# Patient Record
Sex: MALE | Race: ASIAN | NOT HISPANIC OR LATINO | Employment: FULL TIME
[De-identification: names, ages, dates, MRNs, and addresses within clinical notes are randomized per-mention and may not be internally consistent; named-entity substitution may affect disease eponyms.]

---

## 2018-09-08 ENCOUNTER — HOSPITAL (OUTPATIENT)
Dept: OTHER | Age: 53
End: 2018-09-08

## 2019-05-30 ENCOUNTER — HOSPITAL (OUTPATIENT)
Dept: OTHER | Age: 54
End: 2019-05-30
Attending: INTERNAL MEDICINE

## 2020-09-29 ENCOUNTER — APPOINTMENT (OUTPATIENT)
Dept: ULTRASOUND IMAGING | Age: 55
End: 2020-09-29
Attending: INTERNAL MEDICINE

## 2021-05-14 ENCOUNTER — TELEPHONE (OUTPATIENT)
Dept: SURGERY | Facility: CLINIC | Age: 56
End: 2021-05-14

## 2021-05-14 ENCOUNTER — OFFICE VISIT (OUTPATIENT)
Dept: SURGERY | Facility: CLINIC | Age: 56
End: 2021-05-14
Payer: COMMERCIAL

## 2021-05-14 ENCOUNTER — TELEPHONE (OUTPATIENT)
Dept: PAIN CLINIC | Facility: CLINIC | Age: 56
End: 2021-05-14

## 2021-05-14 VITALS
HEIGHT: 72 IN | SYSTOLIC BLOOD PRESSURE: 118 MMHG | DIASTOLIC BLOOD PRESSURE: 76 MMHG | WEIGHT: 220 LBS | BODY MASS INDEX: 29.8 KG/M2

## 2021-05-14 DIAGNOSIS — R29.898 RIGIDITY (MUSCLES): ICD-10-CM

## 2021-05-14 DIAGNOSIS — M48.02 CERVICAL STENOSIS OF SPINAL CANAL: ICD-10-CM

## 2021-05-14 DIAGNOSIS — G25.9 MOVEMENT DISORDER: ICD-10-CM

## 2021-05-14 DIAGNOSIS — M48.04 THORACIC STENOSIS: ICD-10-CM

## 2021-05-14 DIAGNOSIS — R26.9 NEUROLOGIC GAIT DYSFUNCTION: Primary | ICD-10-CM

## 2021-05-14 PROCEDURE — 3078F DIAST BP <80 MM HG: CPT | Performed by: PHYSICIAN ASSISTANT

## 2021-05-14 PROCEDURE — 3074F SYST BP LT 130 MM HG: CPT | Performed by: PHYSICIAN ASSISTANT

## 2021-05-14 PROCEDURE — 3008F BODY MASS INDEX DOCD: CPT | Performed by: PHYSICIAN ASSISTANT

## 2021-05-14 PROCEDURE — 99204 OFFICE O/P NEW MOD 45 MIN: CPT | Performed by: PHYSICIAN ASSISTANT

## 2021-05-14 RX ORDER — GLIPIZIDE 10 MG/1
10 TABLET ORAL DAILY
COMMUNITY
End: 2021-11-17

## 2021-05-14 RX ORDER — B-COMPLEX WITH VITAMIN C
TABLET ORAL
COMMUNITY

## 2021-05-14 RX ORDER — CYANOCOBALAMIN 500 UG/1
SPRAY NASAL
COMMUNITY
Start: 2021-02-18

## 2021-05-14 RX ORDER — ROSUVASTATIN CALCIUM 5 MG/1
5 TABLET, COATED ORAL DAILY
COMMUNITY
Start: 2021-04-22

## 2021-05-14 NOTE — PROGRESS NOTES
Right foot is more bothersome  Pain in the back is   Recently lost 60lbs over the last year then noticed foot issue  Has been doing PT  Feels that he has lost 40% of strength, feels like when he is carrying cat up the stairs that is difficult    Had been e

## 2021-05-14 NOTE — PATIENT INSTRUCTIONS
Refill policies:    • Allow 2-3 business days for refills; controlled substances may take longer.   • Contact your pharmacy at least 5 days prior to running out of medication and have them send an electronic request or submit request through the “request re Depending on your insurance carrier, approval may take 3-10 days. It is highly recommended patients contact their insurance carrier directly to determine coverage.   If test is done without insurance authorization, patient may be responsible for the entire his lumbar radiculopathy is easily explained by his lumbar MRI. His current gait pattern and stiffness in the upper and lower extremities is more consistent with a movement disorder than cervical myelopathy.   Further recommendation be forthcoming based up

## 2021-05-14 NOTE — PROGRESS NOTES
[facility] Neurosurgery Consultation      HISTORY OF PRESENT Meka Romeo is a 60year old male here for a spinal consultation. Patient was seen by a neurologist Dr. Amira Christian 11/4/2020. She diagnosed the patient with lumbar neuroforaminal stenosis.   R declining. His fine motor seems to be intact but he is having trouble with keeping a keyboard at the same speed he states it is slower than it used to be he has trouble using his right hand to get his wallet out of his pocket.       PAST MEDICAL HISTORY: both arms and legs. No clonus. Patient can heel and toe walk. Negative Spurling's. Negative Blackwell's. Negative straight leg raise bilaterally but severely tight hamstrings bilaterally.     Upper extremity strength:       Deltoid    Triceps     Biceps

## 2021-05-14 NOTE — TELEPHONE ENCOUNTER
Pt brought in discs of MR Left Shoulder and MR Lumbar Spine dated 3.2.21 and MR Lumbar Spine dated 10. 6.20 from [facility]. Downloaded to [employer].   Discs returned to patient

## 2021-05-14 NOTE — TELEPHONE ENCOUNTER
Pt dropped off MR Lumbar Spine (10.6.20) and MR Shoulder Left Arthrogram (3.5.21) Reports from [facility]. Physician reviewed during [address]   Sent to scanning

## 2021-05-19 NOTE — TELEPHONE ENCOUNTER
Prior authorization request completed for: MRI Brain. cervical and thoracic  Authorization #[id number] at [address] dates: 5/14/21-8/12/21  CPT codes approved: 19520  Number of visits/dates of service approved: 1  Mari

## 2021-05-19 NOTE — TELEPHONE ENCOUNTER
Charley from [facility] called to have (3) MRI orders faxed to [facility]  pt has appt there at 4p today. Faxed to [phone]. Confirmation rec'd.

## 2021-05-19 NOTE — TELEPHONE ENCOUNTER
Notification by PSR of imaging order-request received. Per PSR, orders have been faxed to requesting imaging facility. At [address] [place] [address] per HAJA  [place], [place] on 5/14/21:    \"IMAGING:  MRI of the lumbar spine shows multilevel spondylosis with loss of disc base hei

## 2021-06-04 ENCOUNTER — OFFICE VISIT (OUTPATIENT)
Dept: SURGERY | Facility: CLINIC | Age: 56
End: 2021-06-04
Payer: COMMERCIAL

## 2021-06-04 ENCOUNTER — TELEPHONE (OUTPATIENT)
Dept: SURGERY | Facility: CLINIC | Age: 56
End: 2021-06-04

## 2021-06-04 VITALS — SYSTOLIC BLOOD PRESSURE: 110 MMHG | DIASTOLIC BLOOD PRESSURE: 74 MMHG | HEART RATE: 64 BPM

## 2021-06-04 DIAGNOSIS — M54.12 CERVICAL MYELOPATHY WITH CERVICAL RADICULOPATHY (HCC): Primary | ICD-10-CM

## 2021-06-04 DIAGNOSIS — G95.9 CERVICAL MYELOPATHY WITH CERVICAL RADICULOPATHY (HCC): Primary | ICD-10-CM

## 2021-06-04 DIAGNOSIS — M48.062 SPINAL STENOSIS OF LUMBAR REGION WITH NEUROGENIC CLAUDICATION: ICD-10-CM

## 2021-06-04 DIAGNOSIS — M48.04 THORACIC STENOSIS: ICD-10-CM

## 2021-06-04 PROCEDURE — 3074F SYST BP LT 130 MM HG: CPT | Performed by: PHYSICIAN ASSISTANT

## 2021-06-04 PROCEDURE — 99214 OFFICE O/P EST MOD 30 MIN: CPT | Performed by: PHYSICIAN ASSISTANT

## 2021-06-04 PROCEDURE — 3078F DIAST BP <80 MM HG: CPT | Performed by: PHYSICIAN ASSISTANT

## 2021-06-04 NOTE — PROGRESS NOTES
Pt is here for follow up after imaging, imaging disc brought with pt and uploaded by  staff    Pt denies any pain at the moment     Pt states he is feeling better  Increased strength

## 2021-06-04 NOTE — PATIENT INSTRUCTIONS
Refill policies:    • Allow 2-3 business days for refills; controlled substances may take longer.   • Contact your pharmacy at least 5 days prior to running out of medication and have them send an electronic request or submit request through the “request re Depending on your insurance carrier, approval may take 3-10 days. It is highly recommended patients contact their insurance carrier directly to determine coverage.   If test is done without insurance authorization, patient may be responsible for the entire therapy  If he remains doing well we can see him back in 3 months for strength evaluation if he has a decline recommend he follow-up with a surgeon for his neck  If we isolate any myelopathy from the thoracic spine we can address that as well  If his leg p

## 2021-06-04 NOTE — TELEPHONE ENCOUNTER
Pt brought in MRI disc for brain, cervical and thoracic dated 5.19.21. Also had disc of brain MRI dated 1.29.16. Downloaded into system. Returned discs to patient at office visit.

## 2021-06-04 NOTE — PROGRESS NOTES
[facility]   Follow-up      HISTORY OF PRESENT Farzaneh Tan is a 60year old male she denies using it he has seen a provider who recommended he stop Lipitor and another medication since that time his legs are felt better he is overall felt bowel or bladder incontinence. Denies neck pain or arm pain. Denies numbness in the arms or legs. Denies any specific weakness in the arms but he does get heaviness and stiffness when he first gets up in the morning.   He does state when he walks he limi intact. Face is symmetrical.  He does have slight affect that is flat in the face. Cranial nerves II through XII are intact. Negative pronator drift. Normal finger-to-nose.   Normal rapid alternating movements with may be slight decrease on the right ha cervical spine shows stenosis at C4-5 C5-6 and C6-7.     MRI of the thoracic spine shows spondylitic disc with stenosis at C2-3 and T8-9    ASSESSMENT:  Neurological gait dysfunction  Rigidity and stiffness in the upper and lower extremities  cervical steno

## 2021-08-24 ENCOUNTER — OFFICE VISIT (OUTPATIENT)
Dept: SURGERY | Facility: CLINIC | Age: 56
End: 2021-08-24
Payer: COMMERCIAL

## 2021-08-24 VITALS
OXYGEN SATURATION: 99 % | SYSTOLIC BLOOD PRESSURE: 104 MMHG | HEIGHT: 71.5 IN | DIASTOLIC BLOOD PRESSURE: 70 MMHG | WEIGHT: 223 LBS | RESPIRATION RATE: 16 BRPM | BODY MASS INDEX: 30.54 KG/M2 | HEART RATE: 83 BPM

## 2021-08-24 DIAGNOSIS — M48.04 THORACIC STENOSIS: ICD-10-CM

## 2021-08-24 DIAGNOSIS — M48.062 SPINAL STENOSIS OF LUMBAR REGION WITH NEUROGENIC CLAUDICATION: ICD-10-CM

## 2021-08-24 DIAGNOSIS — R26.9 NEUROLOGIC GAIT DYSFUNCTION: ICD-10-CM

## 2021-08-24 DIAGNOSIS — G95.9 CERVICAL MYELOPATHY WITH CERVICAL RADICULOPATHY (HCC): Primary | ICD-10-CM

## 2021-08-24 DIAGNOSIS — M54.12 CERVICAL MYELOPATHY WITH CERVICAL RADICULOPATHY (HCC): Primary | ICD-10-CM

## 2021-08-24 PROCEDURE — 99213 OFFICE O/P EST LOW 20 MIN: CPT | Performed by: PHYSICIAN ASSISTANT

## 2021-08-24 PROCEDURE — 3074F SYST BP LT 130 MM HG: CPT | Performed by: PHYSICIAN ASSISTANT

## 2021-08-24 PROCEDURE — 3078F DIAST BP <80 MM HG: CPT | Performed by: PHYSICIAN ASSISTANT

## 2021-08-24 PROCEDURE — 3008F BODY MASS INDEX DOCD: CPT | Performed by: PHYSICIAN ASSISTANT

## 2021-08-24 NOTE — PROGRESS NOTES
[facility]   Follow-up      HISTORY OF PRESENT Dennise Patel is a 60year old male returns in follow-up. Since his last visit he has been in physical therapy.  He did get a evaluation at [facility] for movement disorder which was negative for [facility] some right leg pain. He denies any neurogenic claudication in the legs. He denies double vision blurry vision loss of vision. Denies any dizziness vertigo or unsteadiness. He complains of difficulty walking and his right leg tends to lag.   He denies does not drink alcohol and does not use drugs. ALLERGIES:  No Known Allergies    REVIEW OF SYSTEMS:  A 10-point system was reviewed. Pertinent positives and negatives are noted in HPI.       PHYSICAL EXAMINATION:  GENERAL:  Patient is in no acute distre Reflexes DTRs:1/4      IMAGING:  MRI of the lumbar spine shows multilevel spondylosis with loss of disc base height at L1-L2 3 L3-4. He has desiccation of disc at L4-5 with a fairly well-maintained to space height. He has a rudimentary L5-S1.   He has

## 2021-09-27 ENCOUNTER — TELEPHONE (OUTPATIENT)
Dept: NEUROLOGY | Age: 56
End: 2021-09-27

## 2021-10-22 ENCOUNTER — OFFICE VISIT (OUTPATIENT)
Dept: SURGERY | Facility: CLINIC | Age: 56
End: 2021-10-22
Payer: COMMERCIAL

## 2021-10-22 VITALS
DIASTOLIC BLOOD PRESSURE: 82 MMHG | WEIGHT: 223 LBS | BODY MASS INDEX: 30.54 KG/M2 | HEIGHT: 71.5 IN | SYSTOLIC BLOOD PRESSURE: 118 MMHG

## 2021-10-22 DIAGNOSIS — M51.16 LUMBAR DISC HERNIATION WITH RADICULOPATHY: Primary | ICD-10-CM

## 2021-10-22 DIAGNOSIS — M48.062 SPINAL STENOSIS OF LUMBAR REGION WITH NEUROGENIC CLAUDICATION: ICD-10-CM

## 2021-10-22 PROCEDURE — 99214 OFFICE O/P EST MOD 30 MIN: CPT | Performed by: PHYSICIAN ASSISTANT

## 2021-10-22 PROCEDURE — 3074F SYST BP LT 130 MM HG: CPT | Performed by: PHYSICIAN ASSISTANT

## 2021-10-22 PROCEDURE — 3008F BODY MASS INDEX DOCD: CPT | Performed by: PHYSICIAN ASSISTANT

## 2021-10-22 PROCEDURE — 3079F DIAST BP 80-89 MM HG: CPT | Performed by: PHYSICIAN ASSISTANT

## 2021-10-22 RX ORDER — LINACLOTIDE 145 UG/1
CAPSULE, GELATIN COATED ORAL
COMMUNITY
Start: 2021-10-14

## 2021-10-22 NOTE — PROGRESS NOTES
Was doing some PT at home, had insurance issues getting approved so had some exercises from previous therapy and was doing that at home     Has been doing 2-3 x per week since last visit    Has some good days and bad days, sometimes speed will get slow, an

## 2021-10-22 NOTE — PROGRESS NOTES
[facility]   Follow-up      HISTORY OF PRESENT Baljeet Holguin is a 60year old male returns in follow-up. Since his last visit he did have an EMG showed some lumbar radiculitis but no other clear pathology for his dragging of his feet.   Stat neuropathy recommended EMG. She also ordered a MAURIZIO scan. He did not do the DaTscan. But he did do the EMG. The results show some irritation of the L4 nerve in the lumbar spine but no peripheral neuropathy or other findings.     He states originally he h Parkinson disease ([facility] [address]    • Rigidity (muscles)        PAST SURGICAL HISTORY:  Past Surgical History:   Procedure Laterality Date   • OTHER SURGICAL HISTORY      sinus surgery   • OTHER SURGICAL HISTORY      left knee surgery   • TONSILLECTOMY         JAMIE C5-6 and C6-7.     MRI of the thoracic spine shows spondylitic disc with stenosis at C2-3 and T8-9    ASSESSMENT:  Neurological gait dysfunction improved  cervical stenosis C4–5–6/7   thoracic stenosis T2-3, 8–9  Lumbar spondylosis   Lumbar radiculopathy

## 2021-10-25 ENCOUNTER — TELEPHONE (OUTPATIENT)
Dept: SURGERY | Facility: CLINIC | Age: 56
End: 2021-10-25

## 2021-10-25 NOTE — TELEPHONE ENCOUNTER
[facility]  requesting Clinical Notes in regards to Patients MRI order. Faxed LOV Note to [phone]. Confirmation received.

## 2021-10-25 NOTE — TELEPHONE ENCOUNTER
[facility] requested MRI Spine Lumbar Order be faxed to [phone].   Faxed per request, confirmation received

## 2021-10-29 ENCOUNTER — TELEPHONE (OUTPATIENT)
Dept: SURGERY | Facility: CLINIC | Age: 56
End: 2021-10-29

## 2021-10-29 NOTE — TELEPHONE ENCOUNTER
Pt called again to inform us that [facility] Marleni CARR would be faxing us the insurance denial

## 2021-10-29 NOTE — TELEPHONE ENCOUNTER
Pt calling to let Tigist Luisito know that [facility] [address] told him that his insurance denied his lumbar MRI because he has already had 2 MRI's in the past 12 months. He said a peer to peer review may need to be done. Please call pt to discuss.

## 2021-11-01 ENCOUNTER — TELEPHONE (OUTPATIENT)
Dept: SURGERY | Facility: CLINIC | Age: 56
End: 2021-11-01

## 2021-11-01 NOTE — TELEPHONE ENCOUNTER
Pt was to complete MRI and follow up with RAISA Gordon on 10/29/21     Pt informed that MRI order denied and we have not received fax confirming this and pt cancelled f/u d/t not having imaging completed.      Routed to provider     Routed to PA team to c

## 2021-11-01 NOTE — TELEPHONE ENCOUNTER
For some reason we did not start this PA. It was started by the site which is [employer]. Looks like they request our notes.     This is denied because it says that a same study has been done and the provider has the result and not indication in the

## 2021-11-10 ENCOUNTER — TELEPHONE (OUTPATIENT)
Dept: SURGERY | Facility: CLINIC | Age: 56
End: 2021-11-10

## 2021-11-10 PROBLEM — F44.4: Status: ACTIVE | Noted: 2021-11-10

## 2021-11-10 PROBLEM — M48.02 CERVICAL STENOSIS OF SPINAL CANAL: Status: ACTIVE | Noted: 2021-11-10

## 2021-11-10 PROBLEM — G20.A1 PARKINSON DISEASE  (CMD): Status: ACTIVE | Noted: 2021-11-10

## 2021-11-10 PROBLEM — M48.061 LUMBAR SPINAL STENOSIS: Status: ACTIVE | Noted: 2021-11-10

## 2021-11-10 PROBLEM — I10 ESSENTIAL HYPERTENSION: Status: ACTIVE | Noted: 2021-11-10

## 2021-11-10 PROBLEM — E78.5 HYPERLIPIDEMIA: Status: ACTIVE | Noted: 2021-11-10

## 2021-11-10 PROBLEM — E11.9 DIABETES MELLITUS, TYPE 2  (CMD): Status: ACTIVE | Noted: 2019-06-27

## 2021-11-11 NOTE — TELEPHONE ENCOUNTER
Paperwork received by nursing, endorsed to provider for review. Will be sent to scanning once received back from provider.

## 2021-11-12 ENCOUNTER — TELEPHONE (OUTPATIENT)
Dept: SURGERY | Facility: CLINIC | Age: 56
End: 2021-11-12

## 2021-11-12 ENCOUNTER — TELEPHONE (OUTPATIENT)
Dept: PAIN CLINIC | Facility: CLINIC | Age: 56
End: 2021-11-12

## 2021-11-12 ENCOUNTER — OFFICE VISIT (OUTPATIENT)
Dept: SURGERY | Facility: CLINIC | Age: 56
End: 2021-11-12
Payer: COMMERCIAL

## 2021-11-12 VITALS
WEIGHT: 223 LBS | SYSTOLIC BLOOD PRESSURE: 120 MMHG | HEIGHT: 71 IN | BODY MASS INDEX: 31.22 KG/M2 | DIASTOLIC BLOOD PRESSURE: 68 MMHG | HEART RATE: 60 BPM

## 2021-11-12 DIAGNOSIS — M48.062 SPINAL STENOSIS OF LUMBAR REGION WITH NEUROGENIC CLAUDICATION: Primary | ICD-10-CM

## 2021-11-12 PROCEDURE — 3078F DIAST BP <80 MM HG: CPT | Performed by: PHYSICIAN ASSISTANT

## 2021-11-12 PROCEDURE — 99214 OFFICE O/P EST MOD 30 MIN: CPT | Performed by: PHYSICIAN ASSISTANT

## 2021-11-12 PROCEDURE — 3074F SYST BP LT 130 MM HG: CPT | Performed by: PHYSICIAN ASSISTANT

## 2021-11-12 PROCEDURE — 3008F BODY MASS INDEX DOCD: CPT | Performed by: PHYSICIAN ASSISTANT

## 2021-11-12 RX ORDER — AZITHROMYCIN 250 MG/1
TABLET, FILM COATED ORAL
COMMUNITY
Start: 2021-10-25

## 2021-11-12 RX ORDER — UBIDECARENONE 100 MG
CAPSULE ORAL
COMMUNITY

## 2021-11-12 RX ORDER — LEVOTHYROXINE SODIUM 137 UG/1
TABLET ORAL
COMMUNITY
Start: 2021-10-19

## 2021-11-12 NOTE — PROGRESS NOTES
[facility] Neurosurgery   Follow-up      HISTORY OF PRESENT Claire Her is a 60year old male returns in follow-up. Since his last visit he did see Dr. Mumtaz Posada for his knee. That checked out without any problems.   He did go to [facility] and while walking 50 pound suitcase. 5/14/2021  here for a spinal consultation. Patient was seen by a neurologist Dr. Tammie Horton 11/4/2020. She diagnosed the patient with lumbar neuroforaminal stenosis. Recommend to continue chiropractic care.   She noted some abnormalit with keeping a keyboard at the same speed he states it is slower than it used to be he has trouble using his right hand to get his wallet out of his pocket.       PAST MEDICAL HISTORY:  Past Medical History:   Diagnosis Date   • Cervical stenosis of spinal 5         5 5      Reflexes DTRs:1/4      IMAGING:  MRI of the lumbar spine shows multilevel spondylosis with loss of disc base height at L1-L2 3 L3-4. He has desiccation of disc at L4-5 with a fairly well-maintained to space height.   He has a rudimenta

## 2021-11-12 NOTE — TELEPHONE ENCOUNTER
Received MRI Lumbar Spine result dated 11/8/21 from [facility].   Placed in Nurse in basket for provider review

## 2021-11-12 NOTE — TELEPHONE ENCOUNTER
Pt brought in imaging on a disc. Imaging has been uploaded to [employer]. Imaging handed back to the patient.

## 2021-11-12 NOTE — PROGRESS NOTES
Pt is here regarding: follow up , review imaging ,lumbar back     Pain level at this moment: 3/10    What 1 location is your pain most intense:  States he's having pain on his right side of lower back and travels down the outer part of his right leg with n

## 2021-11-12 NOTE — PATIENT INSTRUCTIONS
PLAN:  -Pain clinic for a caudal epidural  -Follow-up after injection  -Images were reviewed his questions were answered

## 2021-11-12 NOTE — TELEPHONE ENCOUNTER
Patient schedule an appointment with Dr. Trevizo Later the first week of December but he realizes he needs to go to [facility] during that same week.     He is requesting if one of the MELI is good see him sooner and possibly set up the shot before [place] leaves [place]

## 2021-11-14 PROBLEM — E03.8 OTHER SPECIFIED HYPOTHYROIDISM: Status: ACTIVE | Noted: 2021-11-14

## 2021-11-15 NOTE — TELEPHONE ENCOUNTER
Received [facility] follow up documents initialed by HAJA Ram, [address] who is requesting to have notes faxed to Dr. Maren Doyle. Notes faxed to [phone].

## 2021-11-15 NOTE — TELEPHONE ENCOUNTER
Received Imaging report which has been endorsed to Dustin Mora for review. Copy made and sent to scan to chart. MRI lumbar spine was completed on 11/8/21. Per Dustin Mora at [address] on 11/12/21:    \"MRI of the lumbar spine from October 2021 shows mode

## 2021-11-17 ENCOUNTER — OFFICE VISIT (OUTPATIENT)
Dept: PAIN CLINIC | Facility: CLINIC | Age: 56
End: 2021-11-17
Payer: COMMERCIAL

## 2021-11-17 VITALS
RESPIRATION RATE: 16 BRPM | HEART RATE: 82 BPM | OXYGEN SATURATION: 99 % | BODY MASS INDEX: 32 KG/M2 | DIASTOLIC BLOOD PRESSURE: 80 MMHG | WEIGHT: 226 LBS | SYSTOLIC BLOOD PRESSURE: 122 MMHG

## 2021-11-17 DIAGNOSIS — M48.061 LUMBAR FORAMINAL STENOSIS: Primary | ICD-10-CM

## 2021-11-17 DIAGNOSIS — M54.16 LUMBAR RADICULITIS: ICD-10-CM

## 2021-11-17 PROBLEM — E53.8 VITAMIN B12 DEFICIENCY: Status: ACTIVE | Noted: 2021-11-17

## 2021-11-17 PROCEDURE — 3079F DIAST BP 80-89 MM HG: CPT | Performed by: PHYSICIAN ASSISTANT

## 2021-11-17 PROCEDURE — 99204 OFFICE O/P NEW MOD 45 MIN: CPT | Performed by: PHYSICIAN ASSISTANT

## 2021-11-17 PROCEDURE — 3074F SYST BP LT 130 MM HG: CPT | Performed by: PHYSICIAN ASSISTANT

## 2021-11-17 RX ORDER — KRILL/OM-3/DHA/EPA/PHOSPHO/AST 500MG-86MG
CAPSULE ORAL
COMMUNITY

## 2021-11-17 RX ORDER — ASPIRIN 81 MG/1
81 TABLET, CHEWABLE ORAL DAILY
COMMUNITY

## 2021-11-17 NOTE — PROGRESS NOTES
Patient: Miranda Hammonds  Medical Record Number: [id number]  Site: [facility] [address]  Referring Physician:  Cayla Owen PA-C   PCP: n/a    Dear Dr. Carlos King: Thank you very much for requesting this consultation.  I had the opportunity to eval Used      Tobacco comment: quit in 2018    Vaping Use      Vaping Use: Never used    Substance and Sexual Activity      Alcohol use: Never      Drug use: Never     Current Medications:  Current Outpatient Medications   Medication Sig Dispense Refill   • az observed sitting comfortably on a chair in the exam room alert and oriented times three. He looks consistent with his stated age.     Ht Readings from Last 1 Encounters:  11/12/21 : 71\"    Wt Readings from Last 1 Encounters:  11/12/21 : 223 lb (101.2 kg) blood/bleeding disorders? No  Does patient currently take blood thinners? None  Does patient currently take any antibiotics?    No      Patient is currently on pain medications:  No  Reason pain medications are prescribed: N/A  Pain medications are presc

## 2021-11-17 NOTE — PROGRESS NOTES
Subjective:   Patient ID: Nupur Wong is a 60year old male. HPI    History/Other:   Review of Systems  Current Outpatient Medications   Medication Sig Dispense Refill   • azithromycin 250 MG Oral Tab      • Coenzyme Q10 100 MG Oral Cap Take by mouth. Minimum Pain:   0  Maximum Pain  5    Distribution of Pain:    right    Quality of Pain:   aching    Origin of Pain:    Other unknown    Aggravating Factors:    Standing and Walking    Past Treatments for Current Pain Condition:   NSAIDS    Prior diagnos

## 2021-11-17 NOTE — PROGRESS NOTES
Subjective:   Patient ID: Moni Donnelyl is a 60year old male. HPI    History/Other:   Review of Systems  Current Outpatient Medications   Medication Sig Dispense Refill   • azithromycin 250 MG Oral Tab      • Coenzyme Q10 100 MG Oral Cap Take by mouth.

## 2021-11-23 ENCOUNTER — TELEPHONE (OUTPATIENT)
Dept: PAIN CLINIC | Facility: CLINIC | Age: 56
End: 2021-11-23

## 2021-11-23 DIAGNOSIS — M54.16 LUMBAR RADICULITIS: Primary | ICD-10-CM

## 2021-11-23 NOTE — TELEPHONE ENCOUNTER
Patient advised of insurance approval to proceed with injections and is agreeable to scheduling. Patient scheduled for procedure, pre-procedure instructions reviewed. Patient prefers local anaesthetic.  Reviewed sedation instructions including no fasting, n Stimulator: Please remember to turn device off for procedure.         Medication:   Number of days you need to be off for the following medications:  • Aggrenox 10 days   • Agrylin (Anagrelide) 10 days  • Brilinta (Ticagrelor) 7 days  • Imbruvica (Ibrutinib office with any questions or concerns before or after your procedure at  [phone]. If you are a diabetic, please increase the frequency of your glucose monitoring after the procedure as this may cause a temporary increase in your blood sugar.   Contact

## 2021-11-23 NOTE — TELEPHONE ENCOUNTER
Pt called to check status of PA. I advised that we received approval.. Pt wants to schedule injection. Please advise.

## 2021-11-23 NOTE — TELEPHONE ENCOUNTER
Question Answer Comment   Anesthesia Type Local    Provider [facility]    Location Lab    Procedure Other (please add comment) caudal BEBETO   Medical clearance requested (will send to Pain Navigator) No    Patient has Medicare coverage?  No

## 2021-11-23 NOTE — TELEPHONE ENCOUNTER
Prior authorization request completed for: Caudal BEBETO  Authorization # [id number]  Authorization dates: 11/23/2021 - 2/21/2022  CPT codes approved: 08562  Number of visits/dates of service approved: 1  Physician: Dr. HUNTER [facility]  Location: [facility] sched

## 2021-11-23 NOTE — TELEPHONE ENCOUNTER
Pt calling to check status of PA. PA has not been initiated, advised pt we are currently pending approval. Pt is under assumption PA was initiated on 11/17. PSR did not make pt think otherwise.  Advised pt approval times can be anywhere from 3-5 days min

## 2021-12-01 ENCOUNTER — HOSPITAL ENCOUNTER (OUTPATIENT)
Facility: HOSPITAL | Age: 56
Setting detail: HOSPITAL OUTPATIENT SURGERY
Discharge: HOME | End: 2021-12-01
Attending: ANESTHESIOLOGY | Admitting: ANESTHESIOLOGY
Payer: COMMERCIAL

## 2021-12-01 ENCOUNTER — APPOINTMENT (OUTPATIENT)
Dept: GENERAL RADIOLOGY | Facility: HOSPITAL | Age: 56
End: 2021-12-01
Attending: ANESTHESIOLOGY
Payer: COMMERCIAL

## 2021-12-01 VITALS
SYSTOLIC BLOOD PRESSURE: 133 MMHG | OXYGEN SATURATION: 99 % | HEART RATE: 61 BPM | RESPIRATION RATE: 16 BRPM | TEMPERATURE: 98 F | DIASTOLIC BLOOD PRESSURE: 87 MMHG

## 2021-12-01 DIAGNOSIS — M54.16 LUMBAR RADICULITIS: ICD-10-CM

## 2021-12-01 PROCEDURE — 3E0R3BZ INTRODUCTION OF ANESTHETIC AGENT INTO SPINAL CANAL, PERCUTANEOUS APPROACH: ICD-10-PCS | Performed by: ANESTHESIOLOGY

## 2021-12-01 PROCEDURE — 3E0R33Z INTRODUCTION OF ANTI-INFLAMMATORY INTO SPINAL CANAL, PERCUTANEOUS APPROACH: ICD-10-PCS | Performed by: ANESTHESIOLOGY

## 2021-12-01 RX ORDER — DIPHENHYDRAMINE HYDROCHLORIDE 50 MG/ML
50 INJECTION INTRAMUSCULAR; INTRAVENOUS ONCE AS NEEDED
Status: CANCELLED | OUTPATIENT
Start: 2021-12-01 | End: 2021-12-01

## 2021-12-01 RX ORDER — DEXAMETHASONE SODIUM PHOSPHATE 10 MG/ML
INJECTION, SOLUTION INTRAMUSCULAR; INTRAVENOUS
Status: DISCONTINUED | OUTPATIENT
Start: 2021-12-01 | End: 2021-12-01

## 2021-12-01 RX ORDER — INSULIN ASPART 100 [IU]/ML
3 INJECTION, SOLUTION INTRAVENOUS; SUBCUTANEOUS ONCE
Status: DISCONTINUED | OUTPATIENT
Start: 2021-12-01 | End: 2021-12-01

## 2021-12-01 RX ORDER — DEXTROSE MONOHYDRATE 25 G/50ML
50 INJECTION, SOLUTION INTRAVENOUS
Status: DISCONTINUED | OUTPATIENT
Start: 2021-12-01 | End: 2021-12-01

## 2021-12-01 RX ORDER — SODIUM CHLORIDE, SODIUM LACTATE, POTASSIUM CHLORIDE, CALCIUM CHLORIDE 600; 310; 30; 20 MG/100ML; MG/100ML; MG/100ML; MG/100ML
100 INJECTION, SOLUTION INTRAVENOUS CONTINUOUS
Status: DISCONTINUED | OUTPATIENT
Start: 2021-12-01 | End: 2021-12-01

## 2021-12-01 RX ORDER — LIDOCAINE HYDROCHLORIDE 10 MG/ML
INJECTION, SOLUTION INFILTRATION; PERINEURAL
Status: DISCONTINUED | OUTPATIENT
Start: 2021-12-01 | End: 2021-12-01

## 2021-12-01 RX ORDER — ONDANSETRON 2 MG/ML
4 INJECTION INTRAMUSCULAR; INTRAVENOUS ONCE AS NEEDED
Status: CANCELLED | OUTPATIENT
Start: 2021-12-01 | End: 2021-12-01

## 2021-12-01 RX ORDER — ONDANSETRON 2 MG/ML
4 INJECTION INTRAMUSCULAR; INTRAVENOUS ONCE AS NEEDED
Status: DISCONTINUED | OUTPATIENT
Start: 2021-12-01 | End: 2021-12-01

## 2021-12-01 NOTE — H&P
History & Physical Examination    Patient Name: Yudi Rogel  MRN: [id number]  CSN: [id number]  YOB: 1965    Pre-Operative Diagnosis:  Lumbar radiculitis [M54.16]    Present Illness: 25-year-old male patient with chronic low back pain failed co

## 2021-12-01 NOTE — OPERATIVE REPORT
[facility]  Operative Report  2021     Cristhian Hayes Patient Status:  Hospital Outpatient Surgery    1965 MRN [id number]   Location [address] [address] Attending No att. providers found   [facility] Day # 0 PCP Esteban Jean MD patient tolerated the procedure very well and was discharged to a responsible adult after discharge criteria were met. Complications: None. Follow up: Patient will be followed in the pain clinic as needed basis.         Umer Barber MD

## 2021-12-28 ENCOUNTER — OFFICE VISIT (OUTPATIENT)
Dept: PAIN CLINIC | Facility: CLINIC | Age: 56
End: 2021-12-28
Payer: COMMERCIAL

## 2021-12-28 VITALS
DIASTOLIC BLOOD PRESSURE: 70 MMHG | WEIGHT: 226 LBS | BODY MASS INDEX: 32 KG/M2 | OXYGEN SATURATION: 98 % | HEART RATE: 74 BPM | SYSTOLIC BLOOD PRESSURE: 110 MMHG

## 2021-12-28 DIAGNOSIS — M48.062 SPINAL STENOSIS OF LUMBAR REGION WITH NEUROGENIC CLAUDICATION: Primary | ICD-10-CM

## 2021-12-28 PROCEDURE — 3078F DIAST BP <80 MM HG: CPT | Performed by: ANESTHESIOLOGY

## 2021-12-28 PROCEDURE — 99212 OFFICE O/P EST SF 10 MIN: CPT | Performed by: ANESTHESIOLOGY

## 2021-12-28 PROCEDURE — 3074F SYST BP LT 130 MM HG: CPT | Performed by: ANESTHESIOLOGY

## 2021-12-28 NOTE — PROGRESS NOTES
Last procedure: CAUDAL EPIDURAL STEROID INJECTION UNDER FLUOROSCOPY WITH LOCAL  Date: 12/01/21  Percentage of relief obtained: 70%  Duration of relief: current     Current Pain Score: 0/10

## 2021-12-30 NOTE — PROGRESS NOTES
Name: Nik Dukes   : 1965   DOS: 2021     Pain Clinic Follow Up Visit:   Nik Dukes is a 60year old male who presents for recheck of his chronic low back pain. He is status post caudal epidural steroid injection.   His pain went away comp Flexion of the spine, extension and lateral rotation of the spine does not make any difference in the pain. Straight leg raising test is negative bilaterally now. Motor 5 out of 5, sensory is intact and reflexes 2+. Dorsalis pedis is palpable bilaterally.

## 2022-01-04 PROBLEM — M72.2 PLANTAR FASCIITIS OF LEFT FOOT: Status: ACTIVE | Noted: 2022-01-04

## 2022-01-07 ENCOUNTER — OFFICE VISIT (OUTPATIENT)
Dept: SURGERY | Facility: CLINIC | Age: 57
End: 2022-01-07
Payer: COMMERCIAL

## 2022-01-07 VITALS — WEIGHT: 226 LBS | BODY MASS INDEX: 32 KG/M2 | SYSTOLIC BLOOD PRESSURE: 120 MMHG | DIASTOLIC BLOOD PRESSURE: 80 MMHG

## 2022-01-07 DIAGNOSIS — M48.02 CERVICAL STENOSIS OF SPINAL CANAL: ICD-10-CM

## 2022-01-07 DIAGNOSIS — M48.062 SPINAL STENOSIS OF LUMBAR REGION WITH NEUROGENIC CLAUDICATION: Primary | ICD-10-CM

## 2022-01-07 PROCEDURE — 99213 OFFICE O/P EST LOW 20 MIN: CPT | Performed by: PHYSICIAN ASSISTANT

## 2022-01-07 PROCEDURE — 3079F DIAST BP 80-89 MM HG: CPT | Performed by: PHYSICIAN ASSISTANT

## 2022-01-07 PROCEDURE — 3074F SYST BP LT 130 MM HG: CPT | Performed by: PHYSICIAN ASSISTANT

## 2022-01-07 RX ORDER — SEMAGLUTIDE 1.34 MG/ML
INJECTION, SOLUTION SUBCUTANEOUS
COMMUNITY
Start: 2022-01-04

## 2022-01-07 RX ORDER — SEMAGLUTIDE 1.34 MG/ML
0.5 INJECTION, SOLUTION SUBCUTANEOUS
COMMUNITY
Start: 2022-01-04

## 2022-01-07 NOTE — PROGRESS NOTES
Left foot having plantar fasciits x 2 weeks  Started PT with that, still having pain    Feeling well after injections  Feels that they helped  Right leg was not dragging as much and was able to walk

## 2022-01-07 NOTE — PROGRESS NOTES
[facility]   Follow-up      HISTORY OF PRESENT Chau Cervantes is a 60year old male returns in follow-up.      12/1/2021 lumbar caudal epidural Dr. Vivek Paredes after the injection he states his walking improved dramatically he went to [facility] his legs overall very pleased with his recovery. 6/4/2021  he denies using it he has seen a provider who recommended he stop Lipitor and another medication since that time his legs are felt better he is overall felt better.   He did get an MRI of the cervical tho or arm pain. Denies numbness in the arms or legs. Denies any specific weakness in the arms but he does get heaviness and stiffness when he first gets up in the morning. He does state when he walks he limits his arm swing unconsciously.   He denies any ba orientated x 3. Speech fluent. Comprehension intact. Face is symmetrical.      SPINE:  Negative straight leg raise bilaterally.  Walks with a slightly altered gait favoring his left heel and ankle    Upper extremity strength: Last exam      Deltoid    Tri

## 2022-01-07 NOTE — PATIENT INSTRUCTIONS
Refill policies:    • Allow 2-3 business days for refills; controlled substances may take longer.   • Contact your pharmacy at least 5 days prior to running out of medication and have them send an electronic request or submit request through the “request re Depending on your insurance carrier, approval may take 3-10 days. It is highly recommended patients contact their insurance carrier directly to determine coverage.   If test is done without insurance authorization, patient may be responsible for the entire neurogenic claudication or pain returns call Dr. Mimi Nielsen for a second epidural

## 2022-01-19 ENCOUNTER — V-VISIT (OUTPATIENT)
Dept: NEUROLOGY | Age: 57
End: 2022-01-19

## 2022-01-19 DIAGNOSIS — M48.061 SPINAL STENOSIS OF LUMBAR REGION WITHOUT NEUROGENIC CLAUDICATION: Primary | ICD-10-CM

## 2022-01-19 DIAGNOSIS — E03.8 OTHER SPECIFIED HYPOTHYROIDISM: ICD-10-CM

## 2022-01-19 DIAGNOSIS — E11.9 TYPE 2 DIABETES MELLITUS WITHOUT COMPLICATION, WITHOUT LONG-TERM CURRENT USE OF INSULIN (CMD): ICD-10-CM

## 2022-01-19 PROCEDURE — 99204 OFFICE O/P NEW MOD 45 MIN: CPT | Performed by: PSYCHIATRY & NEUROLOGY

## 2022-01-29 ENCOUNTER — IMAGING SERVICES (OUTPATIENT)
Dept: OTHER | Age: 57
End: 2022-01-29

## 2022-02-25 ENCOUNTER — OFFICE VISIT (OUTPATIENT)
Dept: NEUROLOGY | Age: 57
End: 2022-02-25

## 2022-02-25 VITALS
SYSTOLIC BLOOD PRESSURE: 109 MMHG | HEART RATE: 84 BPM | HEIGHT: 72 IN | TEMPERATURE: 98 F | DIASTOLIC BLOOD PRESSURE: 74 MMHG | BODY MASS INDEX: 30.34 KG/M2 | WEIGHT: 224 LBS

## 2022-02-25 DIAGNOSIS — G20.A1 PARKINSON DISEASE: Primary | ICD-10-CM

## 2022-02-25 PROCEDURE — 3074F SYST BP LT 130 MM HG: CPT | Performed by: PSYCHIATRY & NEUROLOGY

## 2022-02-25 PROCEDURE — 3078F DIAST BP <80 MM HG: CPT | Performed by: PSYCHIATRY & NEUROLOGY

## 2022-02-25 PROCEDURE — 99205 OFFICE O/P NEW HI 60 MIN: CPT | Performed by: PSYCHIATRY & NEUROLOGY

## 2022-02-25 RX ORDER — ASPIRIN 81 MG/1
81 TABLET, CHEWABLE ORAL DAILY
COMMUNITY

## 2022-02-25 RX ORDER — OMEPRAZOLE 20 MG/1
CAPSULE, DELAYED RELEASE ORAL
COMMUNITY
Start: 2022-01-14 | End: 2022-07-07 | Stop reason: DRUGHIGH

## 2022-02-25 ASSESSMENT — PAIN SCALES - GENERAL: PAINLEVEL: 0

## 2022-03-04 ENCOUNTER — TELEPHONE (OUTPATIENT)
Dept: NEUROLOGY | Age: 57
End: 2022-03-04

## 2022-05-17 ENCOUNTER — OFFICE VISIT (OUTPATIENT)
Dept: PAIN CLINIC | Facility: CLINIC | Age: 57
End: 2022-05-17
Payer: COMMERCIAL

## 2022-05-17 VITALS
HEART RATE: 78 BPM | DIASTOLIC BLOOD PRESSURE: 68 MMHG | SYSTOLIC BLOOD PRESSURE: 106 MMHG | BODY MASS INDEX: 32 KG/M2 | OXYGEN SATURATION: 98 % | WEIGHT: 226 LBS

## 2022-05-17 DIAGNOSIS — M54.16 LUMBAR RADICULITIS: Primary | ICD-10-CM

## 2022-05-17 RX ORDER — DAPAGLIFLOZIN 5 MG/1
TABLET, FILM COATED ORAL
COMMUNITY
Start: 2022-05-15

## 2022-05-17 RX ORDER — OMEPRAZOLE 20 MG/1
CAPSULE, DELAYED RELEASE ORAL
COMMUNITY
Start: 2022-01-14

## 2022-05-17 NOTE — PROGRESS NOTES
Patient presents in office today with reported pain in low back    Current pain level reported = 5/10    Last reported dose of aleve yesterday      Narcotic Contract renewal na    Urine Drug screen na

## 2022-07-07 PROBLEM — R05.9 COUGH: Status: ACTIVE | Noted: 2022-07-07

## 2022-07-14 ENCOUNTER — TELEPHONE (OUTPATIENT)
Dept: NEUROLOGY | Facility: CLINIC | Age: 57
End: 2022-07-14

## 2022-07-14 NOTE — TELEPHONE ENCOUNTER
Patient came in today but tested positive for COVID 7 days ago, tested negative 3 days ago but still has cough. Dr Elo Pandya offered Video Visit but patient declined. Informed patient he will receive notification of next available appointment. New patient noemi't scheduled in [place] office for 8/9 at 3pm. Placed on wait list also. Message sent to patient via [address]

## 2022-07-15 ENCOUNTER — APPOINTMENT (OUTPATIENT)
Dept: NEUROLOGY | Age: 57
End: 2022-07-15

## 2022-07-15 NOTE — TELEPHONE ENCOUNTER
Patient declined appointment in [facility] stating he is going to different Neurologist.

## 2022-07-18 ENCOUNTER — TELEPHONE (OUTPATIENT)
Dept: SURGERY | Facility: CLINIC | Age: 57
End: 2022-07-18

## 2022-07-18 NOTE — TELEPHONE ENCOUNTER
Dr. Kenya Srinivasan called and asked for the patient be reevaluated. Patient was called and voicemail was left he identified his self recommend he call back to set up an appointment or go to [address]     Please call and offer him a appointment for reevaluation

## 2022-07-29 ENCOUNTER — OFFICE VISIT (OUTPATIENT)
Dept: NEUROLOGY | Age: 57
End: 2022-07-29

## 2022-07-29 VITALS
HEIGHT: 72 IN | BODY MASS INDEX: 30.07 KG/M2 | WEIGHT: 222 LBS | SYSTOLIC BLOOD PRESSURE: 103 MMHG | HEART RATE: 93 BPM | TEMPERATURE: 96.6 F | DIASTOLIC BLOOD PRESSURE: 69 MMHG

## 2022-07-29 DIAGNOSIS — G20.A1 PARKINSON DISEASE: Primary | ICD-10-CM

## 2022-07-29 PROCEDURE — 3074F SYST BP LT 130 MM HG: CPT | Performed by: PSYCHIATRY & NEUROLOGY

## 2022-07-29 PROCEDURE — 3078F DIAST BP <80 MM HG: CPT | Performed by: PSYCHIATRY & NEUROLOGY

## 2022-07-29 PROCEDURE — 99214 OFFICE O/P EST MOD 30 MIN: CPT | Performed by: PSYCHIATRY & NEUROLOGY

## 2022-07-29 RX ORDER — CYANOCOBALAMIN 1000 UG/ML
1000 INJECTION, SOLUTION INTRAMUSCULAR; SUBCUTANEOUS
COMMUNITY
Start: 2022-04-08

## 2022-07-29 ASSESSMENT — PAIN SCALES - GENERAL: PAINLEVEL: 0

## 2022-08-22 ENCOUNTER — OFFICE VISIT (OUTPATIENT)
Dept: SURGERY | Facility: CLINIC | Age: 57
End: 2022-08-22
Payer: COMMERCIAL

## 2022-08-22 VITALS
SYSTOLIC BLOOD PRESSURE: 100 MMHG | HEIGHT: 72 IN | BODY MASS INDEX: 29.93 KG/M2 | WEIGHT: 221 LBS | DIASTOLIC BLOOD PRESSURE: 68 MMHG | HEART RATE: 78 BPM

## 2022-08-22 DIAGNOSIS — R26.9 NEUROLOGIC GAIT DYSFUNCTION: ICD-10-CM

## 2022-08-22 DIAGNOSIS — G25.9 MOVEMENT DISORDER: ICD-10-CM

## 2022-08-22 DIAGNOSIS — M47.812 CERVICAL SPONDYLOSIS: ICD-10-CM

## 2022-08-22 DIAGNOSIS — M47.816 LUMBAR SPONDYLOSIS: ICD-10-CM

## 2022-08-22 DIAGNOSIS — R29.898 WEAKNESS OF BOTH LOWER EXTREMITIES: Primary | ICD-10-CM

## 2022-08-22 RX ORDER — OMEPRAZOLE 40 MG/1
CAPSULE, DELAYED RELEASE ORAL
COMMUNITY
Start: 2022-07-07

## 2022-08-22 NOTE — PROGRESS NOTES
Pt is being seen today for spinal stenosis. States he has no pain in his back. His right leg feels weak. When walking he feels like he's tapping his right foot.       He goes to PT 2 times a week- states it helping him   Had injections back in nov 2021- states they helping   No back surgeries

## 2022-11-21 PROBLEM — R05.9 COUGH: Status: RESOLVED | Noted: 2022-07-07 | Resolved: 2022-11-21

## 2022-11-29 ENCOUNTER — TELEPHONE (OUTPATIENT)
Dept: SURGERY | Facility: CLINIC | Age: 57
End: 2022-11-29

## 2022-11-29 ENCOUNTER — OFFICE VISIT (OUTPATIENT)
Dept: SURGERY | Facility: CLINIC | Age: 57
End: 2022-11-29
Payer: COMMERCIAL

## 2022-11-29 VITALS
OXYGEN SATURATION: 98 % | SYSTOLIC BLOOD PRESSURE: 120 MMHG | HEART RATE: 79 BPM | BODY MASS INDEX: 30.48 KG/M2 | HEIGHT: 72 IN | DIASTOLIC BLOOD PRESSURE: 76 MMHG | WEIGHT: 225 LBS

## 2022-11-29 DIAGNOSIS — M47.816 LUMBAR SPONDYLOSIS: Primary | ICD-10-CM

## 2022-11-29 DIAGNOSIS — G89.29 CHRONIC RIGHT-SIDED LOW BACK PAIN WITH RIGHT-SIDED SCIATICA: ICD-10-CM

## 2022-11-29 DIAGNOSIS — M54.41 CHRONIC RIGHT-SIDED LOW BACK PAIN WITH RIGHT-SIDED SCIATICA: ICD-10-CM

## 2022-11-29 DIAGNOSIS — M51.16 LUMBAR DISC HERNIATION WITH RADICULOPATHY: ICD-10-CM

## 2022-11-29 PROCEDURE — 99214 OFFICE O/P EST MOD 30 MIN: CPT | Performed by: PHYSICIAN ASSISTANT

## 2022-11-29 PROCEDURE — 3078F DIAST BP <80 MM HG: CPT | Performed by: PHYSICIAN ASSISTANT

## 2022-11-29 PROCEDURE — 3008F BODY MASS INDEX DOCD: CPT | Performed by: PHYSICIAN ASSISTANT

## 2022-11-29 PROCEDURE — 3074F SYST BP LT 130 MM HG: CPT | Performed by: PHYSICIAN ASSISTANT

## 2022-11-29 RX ORDER — PANTOPRAZOLE SODIUM 40 MG/1
40 TABLET, DELAYED RELEASE ORAL DAILY
COMMUNITY

## 2022-11-29 NOTE — TELEPHONE ENCOUNTER
Skye Feng from [place] [address] is requesting doctor notes related to Pt's lumbar spine on behalf of the insurance company. Their fax number is [phone]. Please advise.

## 2022-12-01 ENCOUNTER — TELEPHONE (OUTPATIENT)
Dept: SURGERY | Facility: CLINIC | Age: 57
End: 2022-12-01

## 2022-12-01 NOTE — TELEPHONE ENCOUNTER
Rec'd xray lumbar results from [address] for [employer]. Endorsed to nursing for provider review.

## 2022-12-05 NOTE — TELEPHONE ENCOUNTER
Paperwork received by clinical staff, endorsed to provider for review. Will be sent to scanning once received back from provider.

## 2022-12-06 NOTE — TELEPHONE ENCOUNTER
X-ray report from [facility] imaging 11/29/2022 degenerative disc disease mild at L2-3 L3-4 retrolisthesis unchanged prior MRI.   Stable on flexion-extension

## 2022-12-08 ENCOUNTER — TELEPHONE (OUTPATIENT)
Dept: SURGERY | Facility: CLINIC | Age: 57
End: 2022-12-08

## 2022-12-08 NOTE — TELEPHONE ENCOUNTER
Rec'd  DOS 12/07/22 Findings for the MRI of the lumbar spine from [place] [address]. Endorsed to nursing.

## 2022-12-09 PROBLEM — E53.8 VITAMIN B12 DEFICIENCY: Status: ACTIVE | Noted: 2021-11-17

## 2022-12-09 PROBLEM — E03.8 OTHER SPECIFIED HYPOTHYROIDISM: Status: ACTIVE | Noted: 2021-11-14

## 2022-12-09 PROBLEM — M72.2 PLANTAR FASCIITIS OF LEFT FOOT: Status: ACTIVE | Noted: 2022-01-04

## 2022-12-09 PROBLEM — M62.81 MUSCLE WEAKNESS: Status: ACTIVE | Noted: 2022-12-09

## 2022-12-09 NOTE — TELEPHONE ENCOUNTER
MRI Lumbar report received by clinical staff, endorsed to provider for review. Will be sent to scanning once received back from provider.

## 2022-12-15 ENCOUNTER — TELEPHONE (OUTPATIENT)
Dept: SURGERY | Facility: CLINIC | Age: 57
End: 2022-12-15

## 2022-12-15 ENCOUNTER — OFFICE VISIT (OUTPATIENT)
Dept: SURGERY | Facility: CLINIC | Age: 57
End: 2022-12-15
Payer: COMMERCIAL

## 2022-12-15 VITALS — HEIGHT: 71.25 IN | WEIGHT: 226 LBS | BODY MASS INDEX: 31.29 KG/M2

## 2022-12-15 DIAGNOSIS — M47.816 LUMBAR SPONDYLOSIS: Primary | ICD-10-CM

## 2022-12-15 NOTE — TELEPHONE ENCOUNTER
Pt brought imaging int to appointment. Images were of the XR Lumbar Spine. Disc has been uploaded into pacs. Returned disc to patient.

## 2022-12-15 NOTE — TELEPHONE ENCOUNTER
Pt also brought imaging for MRI Spine dos 12/7/2022 , uploaded to PACS after appt, and returned to pt. Pt would like call to discuss MRI since was not viewed at appt today.

## 2023-01-06 ENCOUNTER — OFFICE VISIT (OUTPATIENT)
Dept: SURGERY | Facility: CLINIC | Age: 58
End: 2023-01-06
Payer: COMMERCIAL

## 2023-01-06 VITALS
WEIGHT: 225 LBS | BODY MASS INDEX: 31.5 KG/M2 | DIASTOLIC BLOOD PRESSURE: 74 MMHG | SYSTOLIC BLOOD PRESSURE: 110 MMHG | HEART RATE: 90 BPM | HEIGHT: 71 IN

## 2023-01-06 DIAGNOSIS — M47.816 LUMBAR SPONDYLOSIS: Primary | ICD-10-CM

## 2023-01-06 PROCEDURE — 99214 OFFICE O/P EST MOD 30 MIN: CPT | Performed by: PHYSICIAN ASSISTANT

## 2023-01-06 PROCEDURE — 3008F BODY MASS INDEX DOCD: CPT | Performed by: PHYSICIAN ASSISTANT

## 2023-01-06 PROCEDURE — 3078F DIAST BP <80 MM HG: CPT | Performed by: PHYSICIAN ASSISTANT

## 2023-01-06 PROCEDURE — 3074F SYST BP LT 130 MM HG: CPT | Performed by: PHYSICIAN ASSISTANT

## 2023-01-06 NOTE — PROGRESS NOTES
Pt is here to follow up on his lower back. MRI is being uploaded. States he is doing well.       Pain 0/10

## 2023-01-12 ENCOUNTER — IMAGING SERVICES (OUTPATIENT)
Dept: OTHER | Age: 58
End: 2023-01-12

## 2023-01-13 ENCOUNTER — IMAGING SERVICES (OUTPATIENT)
Dept: OTHER | Age: 58
End: 2023-01-13

## 2023-01-13 ENCOUNTER — TELEPHONE (OUTPATIENT)
Dept: NEUROLOGY | Age: 58
End: 2023-01-13

## 2023-01-19 ENCOUNTER — HOSPITAL ENCOUNTER (OUTPATIENT)
Dept: NEUROLOGY | Age: 58
Discharge: HOME | End: 2023-01-19
Attending: ORTHOPAEDIC SURGERY

## 2023-01-19 DIAGNOSIS — M50.01 CERVICAL DISC DISORDER WITH MYELOPATHY OF OCCIPITO-ATLANTO-AXIAL REGION: ICD-10-CM

## 2023-01-19 DIAGNOSIS — R53.1 WEAKNESS: ICD-10-CM

## 2023-01-19 DIAGNOSIS — M54.16 LUMBAR RADICULOPATHY: ICD-10-CM

## 2023-01-19 DIAGNOSIS — M21.371 RIGHT FOOT DROP: ICD-10-CM

## 2023-01-19 DIAGNOSIS — G95.20 UNSPECIFIED CORD COMPRESSION (CMD): ICD-10-CM

## 2023-01-19 PROCEDURE — 95911 NRV CNDJ TEST 9-10 STUDIES: CPT

## 2023-01-19 PROCEDURE — 95886 MUSC TEST DONE W/N TEST COMP: CPT

## 2023-02-02 PROBLEM — J06.9 VIRAL UPPER RESPIRATORY TRACT INFECTION: Status: ACTIVE | Noted: 2023-02-02

## 2023-02-24 ENCOUNTER — OFFICE VISIT (OUTPATIENT)
Dept: NEUROLOGY | Age: 58
End: 2023-02-24

## 2023-02-24 VITALS
DIASTOLIC BLOOD PRESSURE: 83 MMHG | BODY MASS INDEX: 29.93 KG/M2 | TEMPERATURE: 97.4 F | HEIGHT: 72 IN | HEART RATE: 90 BPM | WEIGHT: 221 LBS | SYSTOLIC BLOOD PRESSURE: 123 MMHG

## 2023-02-24 DIAGNOSIS — G20.A1 PARKINSON DISEASE: Primary | ICD-10-CM

## 2023-02-24 DIAGNOSIS — M48.061 SPINAL STENOSIS OF LUMBAR REGION WITHOUT NEUROGENIC CLAUDICATION: ICD-10-CM

## 2023-02-24 PROCEDURE — 99214 OFFICE O/P EST MOD 30 MIN: CPT | Performed by: PSYCHIATRY & NEUROLOGY

## 2023-02-24 PROCEDURE — 3079F DIAST BP 80-89 MM HG: CPT | Performed by: PSYCHIATRY & NEUROLOGY

## 2023-02-24 PROCEDURE — 3074F SYST BP LT 130 MM HG: CPT | Performed by: PSYCHIATRY & NEUROLOGY

## 2023-02-24 RX ORDER — SEMAGLUTIDE 1.34 MG/ML
INJECTION, SOLUTION SUBCUTANEOUS
COMMUNITY
Start: 2023-02-02 | End: 2023-07-24

## 2023-02-24 RX ORDER — CYCLOBENZAPRINE HCL 5 MG
5 TABLET ORAL 3 TIMES DAILY PRN
Qty: 30 TABLET | Refills: 5 | Status: SHIPPED | OUTPATIENT
Start: 2023-02-24 | End: 2023-08-02

## 2023-02-24 ASSESSMENT — PAIN SCALES - GENERAL: PAINLEVEL: 0

## 2023-06-14 PROBLEM — Z96.651 S/P TKR (TOTAL KNEE REPLACEMENT), RIGHT: Status: ACTIVE | Noted: 2023-06-14

## 2023-06-14 PROBLEM — Z00.00 ROUTINE GENERAL MEDICAL EXAMINATION AT A HEALTH CARE FACILITY: Status: ACTIVE | Noted: 2023-06-14

## 2023-06-14 PROBLEM — J06.9 VIRAL UPPER RESPIRATORY TRACT INFECTION: Status: RESOLVED | Noted: 2023-02-02 | Resolved: 2023-06-14

## 2023-06-14 PROBLEM — E78.5 HYPERLIPIDEMIA: Status: RESOLVED | Noted: 2021-11-10 | Resolved: 2023-06-14

## 2023-07-13 PROBLEM — I10 ESSENTIAL HYPERTENSION: Status: RESOLVED | Noted: 2021-11-10 | Resolved: 2023-07-13

## 2023-08-02 ENCOUNTER — LAB SERVICES (OUTPATIENT)
Dept: LAB | Age: 58
End: 2023-08-02

## 2023-08-02 ENCOUNTER — OFFICE VISIT (OUTPATIENT)
Dept: CARDIOLOGY | Age: 58
End: 2023-08-02

## 2023-08-02 VITALS
WEIGHT: 227.85 LBS | HEIGHT: 72 IN | SYSTOLIC BLOOD PRESSURE: 105 MMHG | DIASTOLIC BLOOD PRESSURE: 72 MMHG | BODY MASS INDEX: 30.86 KG/M2 | HEART RATE: 86 BPM

## 2023-08-02 DIAGNOSIS — Z96.651 S/P TKR (TOTAL KNEE REPLACEMENT), RIGHT: ICD-10-CM

## 2023-08-02 DIAGNOSIS — G20.A1 PARKINSON DISEASE: ICD-10-CM

## 2023-08-02 DIAGNOSIS — E11.9 TYPE 2 DIABETES MELLITUS WITHOUT COMPLICATION, WITHOUT LONG-TERM CURRENT USE OF INSULIN (CMD): ICD-10-CM

## 2023-08-02 DIAGNOSIS — M48.061 SPINAL STENOSIS OF LUMBAR REGION WITHOUT NEUROGENIC CLAUDICATION: ICD-10-CM

## 2023-08-02 DIAGNOSIS — R00.0 TACHYCARDIA, UNSPECIFIED: ICD-10-CM

## 2023-08-02 DIAGNOSIS — E11.9 TYPE 2 DIABETES MELLITUS WITHOUT COMPLICATION, WITHOUT LONG-TERM CURRENT USE OF INSULIN (CMD): Primary | ICD-10-CM

## 2023-08-02 LAB — T4 FREE SERPL-MCNC: 1.4 NG/DL (ref 0.8–1.5)

## 2023-08-02 PROCEDURE — 3078F DIAST BP <80 MM HG: CPT | Performed by: SPECIALIST

## 2023-08-02 PROCEDURE — 3074F SYST BP LT 130 MM HG: CPT | Performed by: SPECIALIST

## 2023-08-02 PROCEDURE — 84439 ASSAY OF FREE THYROXINE: CPT | Performed by: INTERNAL MEDICINE

## 2023-08-02 PROCEDURE — 99204 OFFICE O/P NEW MOD 45 MIN: CPT | Performed by: SPECIALIST

## 2023-08-02 PROCEDURE — 36415 COLL VENOUS BLD VENIPUNCTURE: CPT | Performed by: SPECIALIST

## 2023-08-02 SDOH — HEALTH STABILITY: PHYSICAL HEALTH: ON AVERAGE, HOW MANY DAYS PER WEEK DO YOU ENGAGE IN MODERATE TO STRENUOUS EXERCISE (LIKE A BRISK WALK)?: 5 DAYS

## 2023-08-02 SDOH — HEALTH STABILITY: PHYSICAL HEALTH: ON AVERAGE, HOW MANY MINUTES DO YOU ENGAGE IN EXERCISE AT THIS LEVEL?: 60 MIN

## 2023-08-02 ASSESSMENT — PATIENT HEALTH QUESTIONNAIRE - PHQ9
SUM OF ALL RESPONSES TO PHQ9 QUESTIONS 1 AND 2: 0
SUM OF ALL RESPONSES TO PHQ9 QUESTIONS 1 AND 2: 0
CLINICAL INTERPRETATION OF PHQ2 SCORE: NO FURTHER SCREENING NEEDED
2. FEELING DOWN, DEPRESSED OR HOPELESS: NOT AT ALL
1. LITTLE INTEREST OR PLEASURE IN DOING THINGS: NOT AT ALL

## 2023-08-07 ENCOUNTER — ANCILLARY PROCEDURE (OUTPATIENT)
Dept: CARDIOLOGY | Age: 58
End: 2023-08-07
Attending: SPECIALIST

## 2023-08-07 ENCOUNTER — EXTERNAL RECORD (OUTPATIENT)
Dept: HEALTH INFORMATION MANAGEMENT | Facility: OTHER | Age: 58
End: 2023-08-07

## 2023-08-07 DIAGNOSIS — E11.9 TYPE 2 DIABETES MELLITUS WITHOUT COMPLICATION, WITHOUT LONG-TERM CURRENT USE OF INSULIN (CMD): ICD-10-CM

## 2023-08-07 DIAGNOSIS — Z96.651 S/P TKR (TOTAL KNEE REPLACEMENT), RIGHT: ICD-10-CM

## 2023-08-07 DIAGNOSIS — M48.061 SPINAL STENOSIS OF LUMBAR REGION WITHOUT NEUROGENIC CLAUDICATION: ICD-10-CM

## 2023-08-07 DIAGNOSIS — G20.A1 PARKINSON DISEASE: ICD-10-CM

## 2023-08-10 ENCOUNTER — APPOINTMENT (OUTPATIENT)
Dept: CARDIOLOGY | Age: 58
End: 2023-08-10

## 2023-08-11 PROCEDURE — 93226 XTRNL ECG REC<48 HR SCAN A/R: CPT | Performed by: INTERNAL MEDICINE

## 2023-08-11 PROCEDURE — 93227 XTRNL ECG REC<48 HR R&I: CPT | Performed by: INTERNAL MEDICINE

## 2023-08-15 ENCOUNTER — ANCILLARY PROCEDURE (OUTPATIENT)
Dept: CARDIOLOGY | Age: 58
End: 2023-08-15
Attending: SPECIALIST

## 2023-08-15 DIAGNOSIS — M48.061 SPINAL STENOSIS OF LUMBAR REGION WITHOUT NEUROGENIC CLAUDICATION: ICD-10-CM

## 2023-08-15 DIAGNOSIS — G20.A1 PARKINSON DISEASE: ICD-10-CM

## 2023-08-15 DIAGNOSIS — E11.9 TYPE 2 DIABETES MELLITUS WITHOUT COMPLICATION, WITHOUT LONG-TERM CURRENT USE OF INSULIN (CMD): ICD-10-CM

## 2023-08-15 DIAGNOSIS — Z96.651 S/P TKR (TOTAL KNEE REPLACEMENT), RIGHT: ICD-10-CM

## 2023-08-15 LAB
AORTIC VALVE AREA (AVA): 0.67
AV PEAK GRADIENT (AVPG): 4
AV PEAK VELOCITY (AVPV): 0.97
AV STENOSIS SEVERITY TEXT: NORMAL
AVI LVOT PEAK GRADIENT (LVOTMG): 1
E WAVE DECELARATION TIME (MDT): 12.25
INTERVENTRICULAR SEPTUM IN END DIASTOLE (IVSD): 2.53
LEFT INTERNAL DIMENSION IN SYSTOLE (LVSD): 1
LEFT VENTRICULAR INTERNAL DIMENSION IN DIASTOLE (LVDD): 3.5
LEFT VENTRICULAR POSTERIOR WALL IN END DIASTOLE (LVPW): 4.9
LV EF: NORMAL %
LVOT VTI (LVOTVTI): 0.8
MV E TISSUE VEL MED (MESV): 10.7
MV E WAVE VEL/E TISSUE VEL MED(MSR): 5.5
MV PEAK A VELOCITY (MVPAV): 178
MV PEAK E VELOCITY (MVPEV): 0.88
RV END SYSTOLIC LONGITUDINAL STRAIN FREE WALL (RVGS): 2.4
TRICUSPID VALVE PEAK REGURGITATION VELOCITY (TRPV): 4
TV ESTIMATED RIGHT ARTERIAL PRESSURE (RAP): 9.57

## 2023-08-15 PROCEDURE — 93306 TTE W/DOPPLER COMPLETE: CPT | Performed by: SPECIALIST

## 2023-09-13 ENCOUNTER — TELEPHONE (OUTPATIENT)
Dept: NEUROLOGY | Age: 58
End: 2023-09-13

## 2023-10-10 ENCOUNTER — TELEPHONE (OUTPATIENT)
Dept: NEUROLOGY | Age: 58
End: 2023-10-10

## 2024-03-11 PROBLEM — M72.2 PLANTAR FASCIITIS OF LEFT FOOT: Status: RESOLVED | Noted: 2022-01-04 | Resolved: 2024-03-11

## 2024-06-13 PROBLEM — K21.9 GASTROESOPHAGEAL REFLUX DISEASE WITHOUT ESOPHAGITIS: Status: ACTIVE | Noted: 2024-06-13

## 2024-06-17 ENCOUNTER — ANESTHESIA EVENT (OUTPATIENT)
Dept: GASTROENTEROLOGY | Age: 59
End: 2024-06-17

## 2024-06-17 ENCOUNTER — HOSPITAL ENCOUNTER (OUTPATIENT)
Dept: GASTROENTEROLOGY | Age: 59
Discharge: HOME | End: 2024-06-17
Attending: INTERNAL MEDICINE

## 2024-06-17 ENCOUNTER — ANESTHESIA (OUTPATIENT)
Dept: GASTROENTEROLOGY | Age: 59
End: 2024-06-17

## 2024-06-17 VITALS
SYSTOLIC BLOOD PRESSURE: 106 MMHG | HEIGHT: 72 IN | HEART RATE: 69 BPM | DIASTOLIC BLOOD PRESSURE: 76 MMHG | TEMPERATURE: 97.9 F | BODY MASS INDEX: 31.53 KG/M2 | OXYGEN SATURATION: 99 % | RESPIRATION RATE: 18 BRPM | WEIGHT: 232.81 LBS

## 2024-06-17 DIAGNOSIS — K29.70 GASTRITIS WITHOUT BLEEDING, UNSPECIFIED CHRONICITY, UNSPECIFIED GASTRITIS TYPE: ICD-10-CM

## 2024-06-17 DIAGNOSIS — R10.13 EPIGASTRIC PAIN: ICD-10-CM

## 2024-06-17 LAB — GLUCOSE BLDC GLUCOMTR-MCNC: 126 MG/DL (ref 70–99)

## 2024-06-17 PROCEDURE — 10002801 HB RX 250 W/O HCPCS: Performed by: ANESTHESIOLOGY

## 2024-06-17 PROCEDURE — 13000001 HB PHASE II RECOVERY EA 30 MINUTES

## 2024-06-17 PROCEDURE — 82962 GLUCOSE BLOOD TEST: CPT

## 2024-06-17 PROCEDURE — 10002800 HB RX 250 W HCPCS: Performed by: ANESTHESIOLOGY

## 2024-06-17 PROCEDURE — 10004451 HB PACU RECOVERY 1ST 30 MINUTES

## 2024-06-17 PROCEDURE — 13000024 HB GI COMPLEX CASE S/U + 1ST 15 MIN

## 2024-06-17 PROCEDURE — 10002807 HB RX 258: Performed by: INTERNAL MEDICINE

## 2024-06-17 PROCEDURE — 13000008 HB ANESTHESIA MAC OUTSIDE OR

## 2024-06-17 RX ORDER — PROPOFOL 10 MG/ML
INJECTION, EMULSION INTRAVENOUS PRN
Status: DISCONTINUED | OUTPATIENT
Start: 2024-06-17 | End: 2024-06-17

## 2024-06-17 RX ORDER — PANTOPRAZOLE SODIUM 20 MG/1
20 TABLET, DELAYED RELEASE ORAL DAILY
COMMUNITY

## 2024-06-17 RX ORDER — SODIUM CHLORIDE 9 MG/ML
INJECTION, SOLUTION INTRAVENOUS CONTINUOUS
Status: DISCONTINUED | OUTPATIENT
Start: 2024-06-17 | End: 2024-06-19 | Stop reason: HOSPADM

## 2024-06-17 RX ADMIN — FENTANYL CITRATE 25 MCG: 50 INJECTION INTRAMUSCULAR; INTRAVENOUS at 09:44

## 2024-06-17 RX ADMIN — SODIUM CHLORIDE: 9 INJECTION, SOLUTION INTRAVENOUS at 09:01

## 2024-06-17 RX ADMIN — PROPOFOL INJECTABLE EMULSION 150 MCG/KG/MIN: 10 INJECTION, EMULSION INTRAVENOUS at 09:41

## 2024-06-17 RX ADMIN — FENTANYL CITRATE 25 MCG: 50 INJECTION INTRAMUSCULAR; INTRAVENOUS at 09:41

## 2024-06-17 RX ADMIN — PROPOFOL 80 MG: 10 INJECTION, EMULSION INTRAVENOUS at 09:41

## 2024-06-17 SDOH — SOCIAL STABILITY: SOCIAL INSECURITY: HOW OFTEN DOES ANYONE, INCLUDING FAMILY AND FRIENDS, THREATEN YOU WITH HARM?: NEVER

## 2024-06-17 SDOH — SOCIAL STABILITY: SOCIAL INSECURITY: HOW OFTEN DOES ANYONE, INCLUDING FAMILY AND FRIENDS, PHYSICALLY HURT YOU?: NEVER

## 2024-06-17 SDOH — SOCIAL STABILITY: SOCIAL INSECURITY: HOW OFTEN DOES ANYONE, INCLUDING FAMILY AND FRIENDS, INSULT OR TALK DOWN TO YOU?: NEVER

## 2024-06-17 SDOH — SOCIAL STABILITY: SOCIAL INSECURITY: HOW OFTEN DOES ANYONE, INCLUDING FAMILY AND FRIENDS, SCREAM OR CURSE AT YOU?: NEVER

## 2024-06-17 ASSESSMENT — ACTIVITIES OF DAILY LIVING (ADL)
ADL_BEFORE_ADMISSION: INDEPENDENT
SENSORY_SUPPORT_DEVICES: EYEGLASSES
NEEDS_ASSIST: NO
ADL_SCORE: 12
RECENT_DECLINE_ADL: NO
ADL_SHORT_OF_BREATH: NO
CHRONIC_PAIN_PRESENT: NO
HISTORY OF FALLING IN THE LAST YEAR (PRIOR TO ADMISSION): NO

## 2024-06-17 ASSESSMENT — PAIN SCALES - GENERAL
PAINLEVEL_OUTOF10: 0

## 2024-07-01 LAB
ASR DISCLAIMER: NORMAL
CASE RPRT: NORMAL
CLINICAL INFO: NORMAL
PATH REPORT.FINAL DX SPEC: NORMAL
PATH REPORT.GROSS SPEC: NORMAL

## 2024-08-16 DIAGNOSIS — E10.9: Primary | ICD-10-CM

## 2024-10-29 PROBLEM — R53.83 FATIGUE: Status: ACTIVE | Noted: 2024-10-29

## 2025-04-01 PROBLEM — R53.83 FATIGUE: Status: RESOLVED | Noted: 2024-10-29 | Resolved: 2025-04-01

## 2025-04-01 PROBLEM — R06.2 WHEEZING: Status: ACTIVE | Noted: 2025-04-01

## 2025-06-16 ENCOUNTER — RESULTS FOLLOW-UP (OUTPATIENT)
Dept: BARIATRICS/WEIGHT MGMT | Age: 60
End: 2025-06-16

## 2025-07-07 DIAGNOSIS — E11.9 TYPE 2 DIABETES MELLITUS WITHOUT COMPLICATION, WITHOUT LONG-TERM CURRENT USE OF INSULIN (CMD): ICD-10-CM

## 2025-07-07 RX ORDER — DAPAGLIFLOZIN 5 MG/1
5 TABLET, FILM COATED ORAL DAILY
Qty: 90 TABLET | Refills: 3 | Status: SHIPPED | OUTPATIENT
Start: 2025-07-07

## (undated) DEVICE — GLOVE SURG SENSICARE SZ 6-1/2

## (undated) DEVICE — GLOVE SURG SENSICARE SZ 7-1/2

## (undated) DEVICE — BANDAID COVERLET 1X3

## (undated) DEVICE — PAIN TRAY: Brand: MEDLINE INDUSTRIES, INC.

## (undated) DEVICE — REMOVER DURAPREP 3M

## (undated) DEVICE — NEEDLE SPINAL 22X3-1/2 BLK

## (undated) NOTE — Clinical Note
Not clear where his hip flexion weakness is coming from.   I recommend he get another opinion at [facility] movement disorder clinic